# Patient Record
Sex: FEMALE | Employment: UNEMPLOYED | URBAN - METROPOLITAN AREA
[De-identification: names, ages, dates, MRNs, and addresses within clinical notes are randomized per-mention and may not be internally consistent; named-entity substitution may affect disease eponyms.]

---

## 2021-04-19 ENCOUNTER — OFFICE VISIT (OUTPATIENT)
Dept: FAMILY MEDICINE | Facility: CLINIC | Age: 31
End: 2021-04-19

## 2021-04-19 VITALS
OXYGEN SATURATION: 97 % | RESPIRATION RATE: 16 BRPM | BODY MASS INDEX: 20.86 KG/M2 | SYSTOLIC BLOOD PRESSURE: 116 MMHG | TEMPERATURE: 97.8 F | WEIGHT: 154 LBS | HEIGHT: 72 IN | DIASTOLIC BLOOD PRESSURE: 78 MMHG | HEART RATE: 78 BPM

## 2021-04-19 DIAGNOSIS — S20.211A CONTUSION OF RIB ON RIGHT SIDE, INITIAL ENCOUNTER: Primary | ICD-10-CM

## 2021-04-19 PROCEDURE — 99203 OFFICE O/P NEW LOW 30 MIN: CPT | Performed by: PHYSICIAN ASSISTANT

## 2021-04-19 RX ORDER — OXYCODONE HYDROCHLORIDE 5 MG/1
5 TABLET ORAL EVERY 6 HOURS PRN
Qty: 6 TABLET | Refills: 0 | Status: SHIPPED | OUTPATIENT
Start: 2021-04-19 | End: 2021-04-22

## 2021-04-19 ASSESSMENT — MIFFLIN-ST. JEOR: SCORE: 1627.7

## 2021-04-19 NOTE — PROGRESS NOTES
"    Assessment & Plan       ICD-10-CM    1. Contusion of rib on right side, initial encounter  S20.211A oxyCODONE (ROXICODONE) 5 MG tablet     - XR declined by patient, risks of not imaging reviewed. Discussed rib contusion will start to improve approx 72 hours after initial injury; rib fracture may take 6 weeks for full improvement. Continue Tylenol, lidocaine. Start ice several times per day. Temporary rx for oxycodone given to use for breakthrough pain.    Return in about 1 week (around 4/26/2021), or if symptoms worsen or fail to improve.    GILMAR Keller [facility] TONA Uriarte is a 31 year old who presents for the following health issues     HPI     Concern - pain whole right side  Onset: yesterday  Description: pt states that she fell off a ladder and she is having pain in the whole right side of body  Intensity: moderate  Progression of Symptoms:  same  Accompanying Signs & Symptoms: none  Previous history of similar problem: none  Precipitating factors:        Worsened by: none  Alleviating factors:        Improved by: none  Therapies tried and outcome:  none     - Reports pain on Rt sided ribs. Mother is a PA, states her lungs sounded ok yesterday but recommend she be seen today.  - Taking Tylenol 1000 mg QID, lidocaine patch, tizanidine prn; requests something stronger for pain.    Review of Systems   Constitutional, HEENT, cardiovascular, pulmonary, GI, , musculoskeletal, neuro, skin, endocrine and psych systems are negative, except as otherwise noted.      Objective    /78   Pulse 78   Temp 97.8  F (36.6  C)   Resp 16   Ht 2 m (6' 6.75\")   Wt 69.9 kg (154 lb)   SpO2 97%   BMI 17.46 kg/m    Body mass index is 17.46 kg/m .  Physical Exam   GENERAL:  WDWN, no acute distress  PSYCH: pleasant, cooperative  EYES: no discharge, no injection  HENT:  Normocephalic. Moist mucus membranes.  NECK:  Supple, symmetric  LUNGS:  Clear to auscultation " bilaterally without rhonchi, rales, or wheeze. Chest rise symmetric; TTP along right-lateral ribs under axilla.  EXTREMITIES:  No gross deformities, moves all 4 limbs spontaneously, no peripheral edema  SKIN:  Warm and dry, no rash or suspicious lesions    NEUROLOGIC:  alert, sensation grossly intact.    Xray - declined by patient, risks of not imaging discusssed